# Patient Record
(demographics unavailable — no encounter records)

---

## 2024-12-23 NOTE — HISTORY OF PRESENT ILLNESS
[Disease: _____________________] : Disease: [unfilled] [T: ___] : T[unfilled] [N: ___] : N[unfilled] [AJCC Stage: ____] : AJCC Stage: [unfilled] [de-identified] : Age 41: triple negative right breast cancer  Screen detected: she had breast imaging done on 6/19/2024 which showed 0.8 cm mass in the upper inner right breast 3 cm from the nipple. Call back imaging with sonogram showed benign complicated cyst at 1:00 3 cm FN measuring 1.2 cm at site of nodule seen on mammogram. She had follow up breast imaging done on 10/4/2024 which showed indeterminate 1:00 indeterminate nodule. She had biopsy of the right breast 1:00 3 cm FN nodule on 10/14/2024. The biopsy showed invasive ductal carcinoma poorly differentiated ER negative, [place] negative, Her2 negative. MRI of the breast done on 11/13/2024 showed newly diagnosed malignancy in the right breast 1.4 cm enhancing mass containing a biopsy marker in the upper inner quadrant and no significant axillary or internal mammary adenopathy. She underwent right lumpectomy and SLNB with Dr Torres on 12/6/2024. The pathology showed invasive ductal carcinoma measuring 19 mm, sentinel and non-sentinel lymph nodes were negative (0/12), DCIS was present and DCIS within 1 mm of inferior margin.  [de-identified] :  invasive ductal carcinoma poorly differentiated ER negative, [place] negative, Her2 negative [de-identified] : Genetic testing: BRCA 1 positive: [id number] [de-identified] : She is present with her  to review adjuvant recommendations. She has no FH of breast cancer. 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 She takes cholesterol agent but no other medications. Denies any new breast pain or chest wall changes. No back pain, cough or HA. She is taking step by step and not sure if she will have mastectomy. She has not seen GYN for BRCA 1 status. She is not thinking of having more children and does not want to proceed with fertility preservation.

## 2024-12-23 NOTE — CONSULT LETTER
[Dear  ___] : Dear  [unfilled], [Consult Letter:] : I had the pleasure of evaluating your patient, [unfilled]. [( Thank you for referring [unfilled] for consultation for _____ )] : Thank you for referring [unfilled] for consultation for [unfilled] [Please see my note below.] : Please see my note below. [Consult Closing:] : Thank you very much for allowing me to participate in the care of this patient.  If you have any questions, please do not hesitate to contact me. [Sincerely,] : Sincerely, [FreeTextEntry2] : Ness Torres  [facility] [address]  [place] [address] [FreeTextEntry3] : Saud Lawton MD Attending [facility] [DrEduard  ___] : Dr. WU

## 2024-12-23 NOTE — REVIEW OF SYSTEMS
[Diarrhea: Grade 0] : Diarrhea: Grade 0 [Negative] : Allergic/Immunologic [Skin Rash] : no skin rash [Skin Wound] : no skin wound [de-identified] : right lumpectomy healing with inner upper breast tenderness occasional

## 2024-12-23 NOTE — ASSESSMENT
"CC: Establish care, eczema      Dorothy Cedeño is a 25 y.o. female here to establish care and to discuss the evaluation and management of:    1. Eczema, unspecified type  Patient states that she has had a dry patch on her right knee for several years.  Has not had it really checked out.  States she has been using some psoriasis over-the-counter lotion it sometimes helps.  States that it does itch.  Denies any bleeding or pustules.  States that it can be painful especially if she scratches too hard.    2. Depressed mood  States she fluctuates with her mood.  States that during the fall she seems to be bit more depressed.  She states that it will be \"cyclic \".  Sometimes this will cycle on and off for 1-2 weeks.  Denies any suicidal thoughts or ideations.  She does make note that she is not interested in starting any sort of medications.    3. Other fatigue  4. Dizziness  Patient states that sometimes she feels a bit dizzy and fatigued.  States that she does not feel like she is staying adequately nutrition.  States that sometimes she self sabotage herself by not eating, especially when she is feeling depressed.  Denies any syncopal episodes.    5. Anxiety  States she occasionally has some anxiety.  Does not like to take any medications so she tries to modify with behavior.    6. IUD (intrauterine device) in place  Currently has the ParaGard in since January 2017.    7. Need for vaccination  Requesting flu vaccine.    8. Need for HPV vaccine  Has not had the HPV vaccinations.        ROS:  Denies any Headache, Blurred Vision, Confusion, Chest pain,  Shortness of breath,  Abdominal pain, Changes of bowel or bladder, Hematuria, Hematochezia, Lower ext. edema, Fevers, Nights sweats, Weight Changes, Focal weakness or numbness.  And all other systems are negative.  Red itchy spot on her right knee      Current Outpatient Prescriptions:   •  triamcinolone acetonide (KENALOG) 0.1 % Cream, Apply 1 g to affected area(s) 2 times a " [FreeTextEntry1] : She is a premenopausal BRCA 1 positive  F with Stage I right breast  invasive ductal carcinoma poorly differentiated ER negative, [place] negative, Her2 negative s/p right lumpectomy. She currently is still deciding if she wants mastectomy. We reviewed the significance of triple negative breast cancer which despite Stage 1 still has chance of local and distant recurrence. We reviewed the role of chemotherapy to lessen the risk of breast cancer recurrence by 40%. We reviewed the different chemotherapy options: ddACT, TC versus CMF. We reviewed potential of chemotherapy to cause early menopause and affect fertility: she does not want fertility preservation and does not want to preserve eggs: has 2 children.   Given her age, her good performance status and triple negative breast cancer, we reviewed the ACT regimen: AC (doxorubicin/ cyclophosphamide) given every 2 weeks for 4 cycles and T (paclitaxel) weekly x 12 weeks. We reviewed potential side effects including but not limited to: hot flash, GI upset, nausea, vomiting, hair loss, allergic reaction, low blood counts, increased risk of infection, bone / muscle pain, numbness/ tingling of the fingers and toes, diarrhea, constipation, and fatigue. We reviewed supportive measures to reduce these side effects. We explained that the supportive measures and therapy can be further modified if she has any intolerances. We reviewed the less than 1% risk of cardiotoxicity and leukemia and how we minimize this risk and monitor for this. Reviewed Echo evaluation of heart function. We reviewed power port placement for ease of chemotherapy administration. Written information about the chemotherapy was provided to her. Questions were answered to her satisfaction and she consented to adjuvant therapy: will plan for 2nd or 3rd week of January: 4 to 6 weeks from surgery to allow for healing.   BRCA 1 positive, We reviewed consideration of oophorectomy and evaluation with GYN: will refer after adjuvant chemotherapy. We reviewed baseline CT of the chest/ abd pelvis prior to adjuvant chemotherapy. Will obtain baseline blood work today.  "day., Disp: 30 g, Rfl: 3    No Known Allergies    Past Medical History:   Diagnosis Date   • UTI (urinary tract infection)     had a lot as a child     Past Surgical History:   Procedure Laterality Date   • TONSILLECTOMY AND ADENOIDECTOMY      at age 3     Family History   Problem Relation Age of Onset   • Thyroid Mother    • Depression Mother    • Anxiety disorder Mother      Social History     Social History   • Marital status: Single     Spouse name: N/A   • Number of children: N/A   • Years of education: N/A     Occupational History   • Not on file.     Social History Main Topics   • Smoking status: Never Smoker   • Smokeless tobacco: Never Used   • Alcohol use Yes      Comment: socially   • Drug use: Yes     Types: Marijuana   • Sexual activity: Yes     Partners: Male     Birth control/ protection: IUD     Other Topics Concern   • Not on file     Social History Narrative   • No narrative on file       Objective:     Vitals: /72 (BP Location: Right arm, Patient Position: Sitting)   Pulse 74   Temp 36.8 °C (98.3 °F) (Temporal)   Resp 14   Ht 1.676 m (5' 6\")   Wt 73 kg (161 lb)   LMP 09/19/2018   SpO2 98%   BMI 25.99 kg/m²      General: Alert, pleasant, NAD  HEENT:  Normocephalic.    Heart:  Regular rate and rhythm.  S1 and S2 normal.  No murmurs appreciated.    Respiratory:  Normal respiratory effort.  Clear to auscultation bilaterally.    Skin:  Warm, dry, no rashes.  Right knee on patella and tibial tuberosity with dry, leg pain, thickened skin.  No definite borders, no purulent drainage, no bleeding.  Musculoskeletal:  Gait is normal.  Moves all extremities well.  Extremities:  . No leg edema.  Neurological: No tremors, sensation grossly intact,   Psych:  Affect/mood is normal, judgement is good, memory is intact, grooming is appropriate.      Assessment and Plan.   25 y.o. female to establish care and discuss following    1. Eczema, unspecified type  Have discussed with the patient that this " [Curative] : Goals of care discussed with patient: Curative seems to be presenting like a eczema or chronic dermatitis.  Present on the right knee just along the tibial tuberosity as well as her patella.  Dry hypertrophied skin, slightly pink in color, not flaky, not bleeding, no pustules, no clearly defined borders.  Will have her try Aquaphor ointment after showers every day.  Have advised her also on her worst spots she may try using triamcinolone cream twice a day.  She will follow back up if symptoms worsen.    - triamcinolone acetonide (KENALOG) 0.1 % Cream; Apply 1 g to affected area(s) 2 times a day.  Dispense: 30 g; Refill: 3    2. Depressed mood  Patient states that she has been having this for several years.  States that she will cycle on and off with her depressed mood.  Denies any suicidal thoughts or ideations.  She is not interested in counseling or medications at this time.  Have advised her to try and start journaling, make a list of positive things in her life, ensure adequate hydration, ensure proper activity and exercise.    3. Other fatigue  - TSH WITH REFLEX TO FT4; Future  - COMP METABOLIC PANEL; Future  - CBC WITHOUT DIFFERENTIAL; Future  - FERRITIN; Future  - IRON/TOTAL IRON BIND; Future    4. Dizziness  - COMP METABOLIC PANEL; Future    5. Anxiety  Stable at this time.  Occasional occurrence.  Have reviewed with her some behavior modification she can include such as deep breathing, journaling, stepping away and taking a break from the situation.  She will follow back up if she needs to discuss any further.    6. IUD (intrauterine device) in place  ParaGard in place since June 2017.  Patient states she is tolerating this well.  Follows up with GYN.    7. Need for vaccination  - Influenza Vaccine Quad Injection >3Y (PF)    8. Need for HPV vaccine  This is her first vaccine for HPV.  She has been advised to follow-up for 2 more  vaccines.  - Gardasil 9 (#1)      Health Maintenance:   Influenza:recieved today  Pap: 2017-normal      Return if symptoms  worsen or fail to improve.    I have placed the above orders and discussed them with an approved delegating provider.  The MA is performing the below orders under the direction of Dr. Jason REDDY

## 2024-12-23 NOTE — REVIEW OF SYSTEMS
[Diarrhea: Grade 0] : Diarrhea: Grade 0 [Negative] : Allergic/Immunologic [Skin Rash] : no skin rash [Skin Wound] : no skin wound [de-identified] : right lumpectomy healing with inner upper breast tenderness occasional

## 2024-12-23 NOTE — PHYSICAL EXAM
[Fully active, able to carry on all pre-disease performance without restriction] : Status 0 - Fully active, able to carry on all pre-disease performance without restriction [Normal] : affect appropriate [de-identified] : elgin areolar scar healing; mild edema but no erythema. No palpable axillary LN

## 2024-12-23 NOTE — HISTORY OF PRESENT ILLNESS
[Disease: _____________________] : Disease: [unfilled] [T: ___] : T[unfilled] [N: ___] : N[unfilled] [AJCC Stage: ____] : AJCC Stage: [unfilled] [de-identified] : Age 41: triple negative right breast cancer  Screen detected: she had breast imaging done on 6/19/2024 which showed 0.8 cm mass in the upper inner right breast 3 cm from the nipple. Call back imaging with sonogram showed benign complicated cyst at 1:00 3 cm FN measuring 1.2 cm at site of nodule seen on mammogram. She had follow up breast imaging done on 10/4/2024 which showed indeterminate 1:00 indeterminate nodule. She had biopsy of the right breast 1:00 3 cm FN nodule on 10/14/2024. The biopsy showed invasive ductal carcinoma poorly differentiated ER negative, [place] negative, Her2 negative. MRI of the breast done on 11/13/2024 showed newly diagnosed malignancy in the right breast 1.4 cm enhancing mass containing a biopsy marker in the upper inner quadrant and no significant axillary or internal mammary adenopathy. She underwent right lumpectomy and SLNB with Dr Torres on 12/6/2024. The pathology showed invasive ductal carcinoma measuring 19 mm, sentinel and non-sentinel lymph nodes were negative (0/12), DCIS was present and DCIS within 1 mm of inferior margin.  [de-identified] :  invasive ductal carcinoma poorly differentiated ER negative, [place] negative, Her2 negative [de-identified] : Genetic testing: BRCA 1 positive: [id number] [de-identified] : She is present with her  to review adjuvant recommendations. She has no FH of breast cancer. 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 She takes cholesterol agent but no other medications. Denies any new breast pain or chest wall changes. No back pain, cough or HA. She is taking step by step and not sure if she will have mastectomy. She has not seen GYN for BRCA 1 status. She is not thinking of having more children and does not want to proceed with fertility preservation.

## 2024-12-23 NOTE — PHYSICAL EXAM
[Fully active, able to carry on all pre-disease performance without restriction] : Status 0 - Fully active, able to carry on all pre-disease performance without restriction [Normal] : affect appropriate [de-identified] : elgin areolar scar healing; mild edema but no erythema. No palpable axillary LN

## 2024-12-23 NOTE — CONSULT LETTER
[Dear  ___] : Dear  [unfilled], [Consult Letter:] : I had the pleasure of evaluating your patient, [unfilled]. [( Thank you for referring [unfilled] for consultation for _____ )] : Thank you for referring [unfilled] for consultation for [unfilled] [Please see my note below.] : Please see my note below. [Consult Closing:] : Thank you very much for allowing me to participate in the care of this patient.  If you have any questions, please do not hesitate to contact me. [Sincerely,] : Sincerely, [FreeTextEntry2] : Ness Torres  [facility] [address]  [place] [address] [FreeTextEntry3] : Saud Lawton MD Attending [facility] [DrEduard  ___] : Dr. WU

## 2024-12-23 NOTE — REASON FOR VISIT
[Initial Consultation] : an initial consultation [Spouse] : spouse [FreeTextEntry2] : Evaluation of triple negative breast cancer s/p lumpectomy and SLNB

## 2024-12-23 NOTE — ASSESSMENT
[FreeTextEntry1] : She is a premenopausal BRCA 1 positive  F with Stage I right breast  invasive ductal carcinoma poorly differentiated ER negative, [place] negative, Her2 negative s/p right lumpectomy. She currently is still deciding if she wants mastectomy. We reviewed the significance of triple negative breast cancer which despite Stage 1 still has chance of local and distant recurrence. We reviewed the role of chemotherapy to lessen the risk of breast cancer recurrence by 40%. We reviewed the different chemotherapy options: ddACT, TC versus CMF. We reviewed potential of chemotherapy to cause early menopause and affect fertility: she does not want fertility preservation and does not want to preserve eggs: has 2 children.   Given her age, her good performance status and triple negative breast cancer, we reviewed the ACT regimen: AC (doxorubicin/ cyclophosphamide) given every 2 weeks for 4 cycles and T (paclitaxel) weekly x 12 weeks. We reviewed potential side effects including but not limited to: hot flash, GI upset, nausea, vomiting, hair loss, allergic reaction, low blood counts, increased risk of infection, bone / muscle pain, numbness/ tingling of the fingers and toes, diarrhea, constipation, and fatigue. We reviewed supportive measures to reduce these side effects. We explained that the supportive measures and therapy can be further modified if she has any intolerances. We reviewed the less than 1% risk of cardiotoxicity and leukemia and how we minimize this risk and monitor for this. Reviewed Echo evaluation of heart function. We reviewed power port placement for ease of chemotherapy administration. Written information about the chemotherapy was provided to her. Questions were answered to her satisfaction and she consented to adjuvant therapy: will plan for 2nd or 3rd week of January: 4 to 6 weeks from surgery to allow for healing.   BRCA 1 positive, We reviewed consideration of oophorectomy and evaluation with GYN: will refer after adjuvant chemotherapy. We reviewed baseline CT of the chest/ abd pelvis prior to adjuvant chemotherapy. Will obtain baseline blood work today.  [Curative] : Goals of care discussed with patient: Curative

## 2025-01-13 NOTE — HISTORY OF PRESENT ILLNESS
[FreeTextEntry1] : accompanied by  Paul [phone] feels ok HCG negative 1/13/2025 only takes cholesterol medication [FreeTextEntry5] : yesterday at 900pm [FreeTextEntry6] : Dr. Muniz

## 2025-01-13 NOTE — HISTORY OF PRESENT ILLNESS
[FreeTextEntry5] : yesterday at 900pm [FreeTextEntry1] : accompanied by  Paul [phone] feels ok HCG negative 1/13/2025 only takes cholesterol medication [FreeTextEntry6] : Dr. Muniz

## 2025-01-13 NOTE — PROCEDURE
[D/C IV on discharge] : D/C IV on discharge [Resume diet] : resume diet [Site check for bleeding/hematoma] : Site check for bleeding/hematoma [Vital signs on admission the q 15 mins x2] : Vital signs on admission the q 15 mins x2 [FreeTextEntry1] : mediport insertion left chest

## 2025-01-13 NOTE — ASSESSMENT
[FreeTextEntry1] : Pt seen and assessed for placement of port for venous access.  Chart reviewed, imaging and labs evaluated and acceptable for intervention. Consent obtained with risks, benefits explained.  Will place port with sedation. Anesthesia plan formulated and discussed with anesthesiology.   8 [place] powerport placed using US and fluoro guidance. Tip in SVC. EBL=minimal.  May use immediately.  Full report to follow.

## 2025-01-13 NOTE — PAST MEDICAL HISTORY
[Increasing age ( >40 years old)] : Increasing age ( >40 years old) [Malignancy] : Malignancy [No therapy indicated for cases scheduled for less than one hour] : No therapy indicated for cases scheduled for less than one hour. [Obesity: BMI >25] : Obesity: BMI >25 [FreeTextEntry1] : Malignant Hyperthermia Screening Tool and Risk of Bleeding Assessment  Ms. CINDA LUIS denies family history of unexpected death following Anesthesia or Exercise. Denies Family history of Malignant Hyperthermia, Muscle or Neuromuscular disorder and High Temperature following exercise.  Ms. CINDA LUIS denies history of Muscle Spasm, Dark or Chocolate - Colored urine and Unanticipated fever immediately following anesthesia or serious exercise.  Ms. LUIS also denies bleeding tendencies/ Risks of Bleeding.

## 2025-01-13 NOTE — ASSESSMENT
[FreeTextEntry1] : Pt seen and assessed for placement of port for venous access.  Chart reviewed, imaging and labs evaluated and acceptable for intervention. Consent obtained with risks, benefits explained.  Will place port with sedation. Anesthesia plan formulated and discussed with anesthesiology.   8 [place]uvian powerport placed using US and fluoro guidance. Tip in SVC. EBL=minimal.  May use immediately.  Full report to follow.

## 2025-01-27 NOTE — HISTORY OF PRESENT ILLNESS
[Disease: _____________________] : Disease: [unfilled] [T: ___] : T[unfilled] [N: ___] : N[unfilled] [AJCC Stage: ____] : AJCC Stage: [unfilled] [de-identified] : Age 41: triple negative right breast cancer  Screen detected: she had breast imaging done on 6/19/2024 which showed 0.8 cm mass in the upper inner right breast 3 cm from the nipple. Call back imaging with sonogram showed benign complicated cyst at 1:00 3 cm FN measuring 1.2 cm at site of nodule seen on mammogram. She had follow up breast imaging done on 10/4/2024 which showed indeterminate 1:00 indeterminate nodule. She had biopsy of the right breast 1:00 3 cm FN nodule on 10/14/2024. The biopsy showed invasive ductal carcinoma poorly differentiated ER negative, [place] negative, Her2 negative. MRI of the breast done on 11/13/2024 showed newly diagnosed malignancy in the right breast 1.4 cm enhancing mass containing a biopsy marker in the upper inner quadrant and no significant axillary or internal mammary adenopathy. She underwent right lumpectomy and SLNB with Dr Torres on 12/6/2024. The pathology showed invasive ductal carcinoma measuring 19 mm, sentinel and non-sentinel lymph nodes were negative (0/12), DCIS was present and DCIS within 1 mm of inferior margin.  [de-identified] :  invasive ductal carcinoma poorly differentiated ER negative, [place] negative, Her2 negative [de-identified] : Genetic testing: BRCA 1 positive: [id number] [de-identified] : She is present with her  to start adjuvant chemo. Healed well from lumpectomy.. Has some anxiety regarding starting chemo today but is agreeable to start therapy. Denies any new breast pain or chest wall changes. No back pain, cough or HA. She is taking step by step and not sure if she will have mastectomy.    [Date: ____________] : Patient's last distress assessment performed on [unfilled]. [7 - Distress Level] : Distress Level: 7 [Referred Patient  to social work for follow-up] : Patient was referred to social work for follow-up

## 2025-01-27 NOTE — REVIEW OF SYSTEMS
[Diarrhea: Grade 0] : Diarrhea: Grade 0 [Negative] : Allergic/Immunologic [Skin Rash] : no skin rash [Skin Wound] : no skin wound [de-identified] : right lumpectomy healing with inner upper breast tenderness occasional

## 2025-01-27 NOTE — CONSULT LETTER
[Dear  ___] : Dear  [unfilled], [Consult Letter:] : I had the pleasure of evaluating your patient, [unfilled]. [( Thank you for referring [unfilled] for consultation for _____ )] : Thank you for referring [unfilled] for consultation for [unfilled] [Please see my note below.] : Please see my note below. [Consult Closing:] : Thank you very much for allowing me to participate in the care of this patient.  If you have any questions, please do not hesitate to contact me. [Sincerely,] : Sincerely, [DrEduard  ___] : Dr. WU [FreeTextEntry2] : Ness Torres  [facility] [address]  [place] [address] [FreeTextEntry3] : Saud Lawton MD Attending [facility]

## 2025-01-27 NOTE — PHYSICAL EXAM
[Fully active, able to carry on all pre-disease performance without restriction] : Status 0 - Fully active, able to carry on all pre-disease performance without restriction [Normal] : affect appropriate [de-identified] : elgin areolar scar healing; mild edema but no erythema. No palpable axillary LN

## 2025-01-27 NOTE — PHYSICAL EXAM
[Fully active, able to carry on all pre-disease performance without restriction] : Status 0 - Fully active, able to carry on all pre-disease performance without restriction [Normal] : affect appropriate [de-identified] : elgin areolar scar healing; mild edema but no erythema. No palpable axillary LN

## 2025-01-27 NOTE — HISTORY OF PRESENT ILLNESS
[Disease: _____________________] : Disease: [unfilled] [T: ___] : T[unfilled] [N: ___] : N[unfilled] [AJCC Stage: ____] : AJCC Stage: [unfilled] [de-identified] : Age 41: triple negative right breast cancer  Screen detected: she had breast imaging done on 6/19/2024 which showed 0.8 cm mass in the upper inner right breast 3 cm from the nipple. Call back imaging with sonogram showed benign complicated cyst at 1:00 3 cm FN measuring 1.2 cm at site of nodule seen on mammogram. She had follow up breast imaging done on 10/4/2024 which showed indeterminate 1:00 indeterminate nodule. She had biopsy of the right breast 1:00 3 cm FN nodule on 10/14/2024. The biopsy showed invasive ductal carcinoma poorly differentiated ER negative, [place] negative, Her2 negative. MRI of the breast done on 11/13/2024 showed newly diagnosed malignancy in the right breast 1.4 cm enhancing mass containing a biopsy marker in the upper inner quadrant and no significant axillary or internal mammary adenopathy. She underwent right lumpectomy and SLNB with Dr Torres on 12/6/2024. The pathology showed invasive ductal carcinoma measuring 19 mm, sentinel and non-sentinel lymph nodes were negative (0/12), DCIS was present and DCIS within 1 mm of inferior margin.  [de-identified] :  invasive ductal carcinoma poorly differentiated ER negative, [place] negative, Her2 negative [de-identified] : Genetic testing: BRCA 1 positive: [id number] [de-identified] : She is present with her  to start adjuvant chemo. Healed well from lumpectomy.. Has some anxiety regarding starting chemo today but is agreeable to start therapy. Denies any new breast pain or chest wall changes. No back pain, cough or HA. She is taking step by step and not sure if she will have mastectomy.    [Date: ____________] : Patient's last distress assessment performed on [unfilled]. [7 - Distress Level] : Distress Level: 7 [Referred Patient  to social work for follow-up] : Patient was referred to social work for follow-up

## 2025-01-27 NOTE — PHYSICAL EXAM
[Fully active, able to carry on all pre-disease performance without restriction] : Status 0 - Fully active, able to carry on all pre-disease performance without restriction [Normal] : affect appropriate [de-identified] : elgin areolar scar healing; mild edema but no erythema. No palpable axillary LN

## 2025-01-27 NOTE — REVIEW OF SYSTEMS
[Diarrhea: Grade 0] : Diarrhea: Grade 0 [Negative] : Allergic/Immunologic [Skin Rash] : no skin rash [Skin Wound] : no skin wound [de-identified] : right lumpectomy healing with inner upper breast tenderness occasional

## 2025-01-27 NOTE — ASSESSMENT
[Curative] : Goals of care discussed with patient: Curative [FreeTextEntry1] : 40 yo premenopausal BRCA 1 positive  F with Stage I right breast  invasive ductal carcinoma poorly differentiated ER negative, LA negative, Her2 negative s/p right lumpectomy. She currently is still deciding if she wants mastectomy.   Reviewed at previous visit: -significance of triple negative breast cancer which despite Stage 1 still has chance of local and distant recurrence. -role of chemotherapy to lessen the risk of breast cancer recurrence by 40%.  -different chemotherapy options: ddACT, TC versus CMF.  --side effects including but not limited to: hot flash, GI upset, nausea, vomiting, hair loss, allergic reaction, low blood counts, increased risk of infection, bone / muscle pain, numbness/ tingling of the fingers and toes, diarrhea, constipation, and fatigue.  -supportive measures to reduce these side effects: supportive measures and therapy can be further modified if she has any intolerances.  -potential for less than 1% risk of cardiotoxicity and leukemia and how we minimize this risk and monitor for this. -potential of chemotherapy to cause early menopause and affect fertility (she does not want fertility preservation and does not want to preserve eggs: has 2 children).   Plan: -Given her age, her good performance status and triple negative breast cancer, plan for AC (doxorubicin/ cyclophosphamide) given every 2 weeks for 4 cycles and T (paclitaxel) weekly x 12 weeks   -s/p Mediport placement -ECHO wnl -CT C/A/P no distant metastases -Labs wnl -patient to start adjuvant ddAC today. (Written information about the chemotherapy was provided to her previously). -chemo consent in chart -reviewed and provided schedule for supportive medications  -BRCA 1 positive: consideration of oophorectomy and evaluation with GYN-plan to refer after adjuvant chemotherapy.    Questions were answered to her satisfaction and she consented to adjuvant therapy:

## 2025-01-27 NOTE — ASSESSMENT
[Curative] : Goals of care discussed with patient: Curative [FreeTextEntry1] : 42 yo premenopausal BRCA 1 positive  F with Stage I right breast  invasive ductal carcinoma poorly differentiated ER negative, NV negative, Her2 negative s/p right lumpectomy. She currently is still deciding if she wants mastectomy.   Reviewed at previous visit: -significance of triple negative breast cancer which despite Stage 1 still has chance of local and distant recurrence. -role of chemotherapy to lessen the risk of breast cancer recurrence by 40%.  -different chemotherapy options: ddACT, TC versus CMF.  --side effects including but not limited to: hot flash, GI upset, nausea, vomiting, hair loss, allergic reaction, low blood counts, increased risk of infection, bone / muscle pain, numbness/ tingling of the fingers and toes, diarrhea, constipation, and fatigue.  -supportive measures to reduce these side effects: supportive measures and therapy can be further modified if she has any intolerances.  -potential for less than 1% risk of cardiotoxicity and leukemia and how we minimize this risk and monitor for this. -potential of chemotherapy to cause early menopause and affect fertility (she does not want fertility preservation and does not want to preserve eggs: has 2 children).   Plan: -Given her age, her good performance status and triple negative breast cancer, plan for AC (doxorubicin/ cyclophosphamide) given every 2 weeks for 4 cycles and T (paclitaxel) weekly x 12 weeks   -s/p Mediport placement -ECHO wnl -CT C/A/P no distant metastases -Labs wnl -patient to start adjuvant ddAC today. (Written information about the chemotherapy was provided to her previously). -chemo consent in chart -reviewed and provided schedule for supportive medications  -BRCA 1 positive: consideration of oophorectomy and evaluation with GYN-plan to refer after adjuvant chemotherapy.    Questions were answered to her satisfaction and she consented to adjuvant therapy:

## 2025-01-27 NOTE — ASSESSMENT
[Curative] : Goals of care discussed with patient: Curative [FreeTextEntry1] : 42 yo premenopausal BRCA 1 positive  F with Stage I right breast  invasive ductal carcinoma poorly differentiated ER negative, [place] negative, Her2 negative s/p right lumpectomy. She currently is still deciding if she wants mastectomy.   Reviewed at previous visit: -significance of triple negative breast cancer which despite Stage 1 still has chance of local and distant recurrence. -role of chemotherapy to lessen the risk of breast cancer recurrence by 40%.  -different chemotherapy options: ddACT, TC versus CMF.  --side effects including but not limited to: hot flash, GI upset, nausea, vomiting, hair loss, allergic reaction, low blood counts, increased risk of infection, bone / muscle pain, numbness/ tingling of the fingers and toes, diarrhea, constipation, and fatigue.  -supportive measures to reduce these side effects: supportive measures and therapy can be further modified if she has any intolerances.  -potential for less than 1% risk of cardiotoxicity and leukemia and how we minimize this risk and monitor for this. -potential of chemotherapy to cause early menopause and affect fertility (she does not want fertility preservation and does not want to preserve eggs: has 2 children).   Plan: -Given her age, her good performance status and triple negative breast cancer, plan for AC (doxorubicin/ cyclophosphamide) given every 2 weeks for 4 cycles and T (paclitaxel) weekly x 12 weeks   -s/p Mediport placement -ECHO wnl -CT C/A/P no distant metastases -Labs wnl -patient to start adjuvant ddAC today. (Written information about the chemotherapy was provided to her previously). -chemo consent in chart -reviewed and provided schedule for supportive medications  -BRCA 1 positive: consideration of oophorectomy and evaluation with GYN-plan to refer after adjuvant chemotherapy.    Questions were answered to her satisfaction and she consented to adjuvant therapy:    no

## 2025-01-27 NOTE — REVIEW OF SYSTEMS
[Diarrhea: Grade 0] : Diarrhea: Grade 0 [Negative] : Allergic/Immunologic [Skin Rash] : no skin rash [Skin Wound] : no skin wound [de-identified] : right lumpectomy healing with inner upper breast tenderness occasional

## 2025-01-27 NOTE — CONSULT LETTER
[Dear  ___] : Dear  [unfilled], [Consult Letter:] : I had the pleasure of evaluating your patient, [unfilled]. [( Thank you for referring [unfilled] for consultation for _____ )] : Thank you for referring [unfilled] for consultation for [unfilled] [Please see my note below.] : Please see my note below. [Consult Closing:] : Thank you very much for allowing me to participate in the care of this patient.  If you have any questions, please do not hesitate to contact me. [Sincerely,] : Sincerely, [DrEduard  ___] : Dr. WU [FreeTextEntry2] : Ness Torres  [facility] [address]  [place] [address] [FreeTextEntry3] : Saud Lawton MD Attending [facility]

## 2025-01-27 NOTE — HISTORY OF PRESENT ILLNESS
[Disease: _____________________] : Disease: [unfilled] [T: ___] : T[unfilled] [N: ___] : N[unfilled] [AJCC Stage: ____] : AJCC Stage: [unfilled] [de-identified] : Age 41: triple negative right breast cancer  Screen detected: she had breast imaging done on 6/19/2024 which showed 0.8 cm mass in the upper inner right breast 3 cm from the nipple. Call back imaging with sonogram showed benign complicated cyst at 1:00 3 cm FN measuring 1.2 cm at site of nodule seen on mammogram. She had follow up breast imaging done on 10/4/2024 which showed indeterminate 1:00 indeterminate nodule. She had biopsy of the right breast 1:00 3 cm FN nodule on 10/14/2024. The biopsy showed invasive ductal carcinoma poorly differentiated ER negative, [place] negative, Her2 negative. MRI of the breast done on 11/13/2024 showed newly diagnosed malignancy in the right breast 1.4 cm enhancing mass containing a biopsy marker in the upper inner quadrant and no significant axillary or internal mammary adenopathy. She underwent right lumpectomy and SLNB with Dr Torres on 12/6/2024. The pathology showed invasive ductal carcinoma measuring 19 mm, sentinel and non-sentinel lymph nodes were negative (0/12), DCIS was present and DCIS within 1 mm of inferior margin.  [de-identified] :  invasive ductal carcinoma poorly differentiated ER negative, [place] negative, Her2 negative [de-identified] : Genetic testing: BRCA 1 positive: [id number] [de-identified] : She is present with her  to start adjuvant chemo. Healed well from lumpectomy.. Has some anxiety regarding starting chemo today but is agreeable to start therapy. Denies any new breast pain or chest wall changes. No back pain, cough or HA. She is taking step by step and not sure if she will have mastectomy.    [Date: ____________] : Patient's last distress assessment performed on [unfilled]. [7 - Distress Level] : Distress Level: 7 [Referred Patient  to social work for follow-up] : Patient was referred to social work for follow-up

## 2025-01-27 NOTE — END OF VISIT
[Time Spent: ___ minutes] : I have spent [unfilled] minutes of time on the encounter which excludes teaching and separately reported services. [] : Fellow [FreeTextEntry3] : She is present to start C1 of AC. She had CT of the chest/ abd/ pelvis without any evidence of disease. She had Echo with EF WNL. We reviewed potential AE from AC and supportive measures. We reviewed timing of AE and expectations for recovery. Questions answered to her satisfaction. She will follow instructions and we will follow up with her. She will  antiemetics from pharmacy. Port site C/D/I; lungs clear, Extremities: no edema. CBC WNL. Will proceed to treatment today.

## 2025-01-27 NOTE — REASON FOR VISIT
[Follow-Up Visit] : a follow-up [Initial Consultation] : an initial consultation [Spouse] : spouse [FreeTextEntry2] : Evaluation of triple negative breast cancer s/p lumpectomy and SLNB

## 2025-01-27 NOTE — CONSULT LETTER
[Dear  ___] : Dear  [unfilled], [Consult Letter:] : I had the pleasure of evaluating your patient, [unfilled]. [( Thank you for referring [unfilled] for consultation for _____ )] : Thank you for referring [unfilled] for consultation for [unfilled] [Please see my note below.] : Please see my note below. [Consult Closing:] : Thank you very much for allowing me to participate in the care of this patient.  If you have any questions, please do not hesitate to contact me. [Sincerely,] : Sincerely, [DrEduard  ___] : Dr. WU [FreeTextEntry2] : Ness Torres  [facility] [address]  [place] [address] [FreeTextEntry3] : Saud Lawton MD Attending [facility]

## 2025-02-07 NOTE — PHYSICAL EXAM
[Fully active, able to carry on all pre-disease performance without restriction] : Status 0 - Fully active, able to carry on all pre-disease performance without restriction [Normal] : affect appropriate [de-identified] : elgin areolar scar healing; mild edema but no erythema. No palpable axillary LN

## 2025-02-07 NOTE — ASSESSMENT
[Curative] : Goals of care discussed with patient: Curative [FreeTextEntry1] : She is a 41 y.o. premenopausal BRCA 1 positive  F with Stage I right breast invasive ductal carcinoma poorly differentiated ER negative, [place] negative, Her2 negative s/p right lumpectomy. She had CT scan which showed no evidence of distant metastasis. She began AC (doxorubicin/ cyclophosphamide) every 2 weeks x 4 cycles and T (paclitaxel) weekly x 12 weeks 01/24/2025. She is s/p C1 and noticed Grade 1 fatigue, constipation, and nausea. We reviewed supportive measures to help reduce these symptoms and reviewed therapy can be modified if she has any intolerances. Counts reviewed and stable to proceed with C2 ddAC today. Questions answered to her satisfaction. She is agreeable with plan. Next follow up in 2 weeks but earlier if any new symptoms.   BRCA 1 positive: consideration of oophorectomy and evaluation with GYN-plan to refer after adjuvant chemotherapy.

## 2025-02-07 NOTE — REVIEW OF SYSTEMS
[Diarrhea: Grade 0] : Diarrhea: Grade 0 [Negative] : Allergic/Immunologic [Fatigue] : fatigue [Constipation] : constipation [Dizziness] : dizziness [Fever] : no fever [Chills] : no chills [Abdominal Pain] : no abdominal pain [Vomiting] : no vomiting [Skin Rash] : no skin rash [Skin Wound] : no skin wound [Confused] : no confusion [Fainting] : no fainting [Difficulty Walking] : no difficulty walking [FreeTextEntry7] : occasional nausea  [de-identified] : right lumpectomy healed with inner upper breast tenderness and mild edema

## 2025-02-07 NOTE — REASON FOR VISIT
[Follow-Up Visit] : a follow-up [Family Member] : family member [FreeTextEntry2] : Follow up triple negative breast cancer on ddAC: C2 today

## 2025-02-07 NOTE — HISTORY OF PRESENT ILLNESS
[Disease: _____________________] : Disease: [unfilled] [T: ___] : T[unfilled] [N: ___] : N[unfilled] [AJCC Stage: ____] : AJCC Stage: [unfilled] [Date: ____________] : Patient's last distress assessment performed on [unfilled]. [7 - Distress Level] : Distress Level: 7 [Referred Patient  to social work for follow-up] : Patient was referred to social work for follow-up [de-identified] : Age 41: triple negative right breast cancer  Screen detected: she had breast imaging done on 6/19/2024 which showed 0.8 cm mass in the upper inner right breast 3 cm from the nipple. Call back imaging with sonogram showed benign complicated cyst at 1:00 3 cm FN measuring 1.2 cm at site of nodule seen on mammogram. She had follow up breast imaging done on 10/4/2024 which showed indeterminate 1:00 indeterminate nodule. She had biopsy of the right breast 1:00 3 cm FN nodule on 10/14/2024. The biopsy showed invasive ductal carcinoma poorly differentiated ER negative, [place] negative, Her2 negative. MRI of the breast done on 11/13/2024 showed newly diagnosed malignancy in the right breast 1.4 cm enhancing mass containing a biopsy marker in the upper inner quadrant and no significant axillary or internal mammary adenopathy. She underwent right lumpectomy and SLNB with Dr Torres on 12/6/2024. The pathology showed invasive ductal carcinoma measuring 19 mm, sentinel and non-sentinel lymph nodes were negative (0/12), DCIS was present and DCIS within 1 mm of inferior margin.  [de-identified] :  invasive ductal carcinoma poorly differentiated ER negative, [place] negative, Her2 negative [de-identified] : Genetic testing: BRCA 1 positive: [id number] [de-identified] : She is s/p C1 ddAC and noticed fatigue and dizziness that lasted for a few days post treatment then self-resolved. She denies chest pain or SOB. She had occasional nausea and took prochlorperazine with relief of symptoms, no vomiting. She has a good appetite and had constipation that was relieved with Miralax. She denies any arthralgias/myalgias or bone pain.

## 2025-02-21 NOTE — REASON FOR VISIT
[Follow-Up Visit] : a follow-up [Family Member] : family member [FreeTextEntry2] : Follow up triple negative breast cancer on ddAC: C3 today

## 2025-02-21 NOTE — HISTORY OF PRESENT ILLNESS
[Disease: _____________________] : Disease: [unfilled] [T: ___] : T[unfilled] [N: ___] : N[unfilled] [AJCC Stage: ____] : AJCC Stage: [unfilled] [de-identified] : Age 41: triple negative right breast cancer  Screen detected: she had breast imaging done on 6/19/2024 which showed 0.8 cm mass in the upper inner right breast 3 cm from the nipple. Call back imaging with sonogram showed benign complicated cyst at 1:00 3 cm FN measuring 1.2 cm at site of nodule seen on mammogram. She had follow up breast imaging done on 10/4/2024 which showed indeterminate 1:00 indeterminate nodule. She had biopsy of the right breast 1:00 3 cm FN nodule on 10/14/2024. The biopsy showed invasive ductal carcinoma poorly differentiated ER negative, [place] negative, Her2 negative. MRI of the breast done on 11/13/2024 showed newly diagnosed malignancy in the right breast 1.4 cm enhancing mass containing a biopsy marker in the upper inner quadrant and no significant axillary or internal mammary adenopathy. She underwent right lumpectomy and SLNB with Dr Torres on 12/6/2024. The pathology showed invasive ductal carcinoma measuring 19 mm, sentinel and non-sentinel lymph nodes were negative (0/12), DCIS was present and DCIS within 1 mm of inferior margin.  [de-identified] :  invasive ductal carcinoma poorly differentiated ER negative, [place] negative, Her2 negative [de-identified] : Genetic testing: BRCA 1 positive: [id number] [de-identified] : She is s/p C2 ddAC and noticed fatigue and occasional dizziness that lasted for a few days post treatment then self-resolved. No chest pain or SOB. She denies nausea, vomiting, or diarrhea. She continues to have a good appetite. Has constipation that is improved with Miralax. She denies any arthralgias/myalgias or bone pain. No pain or skin changes over the breasts.

## 2025-02-21 NOTE — REVIEW OF SYSTEMS
[Fatigue] : fatigue [Diarrhea: Grade 0] : Diarrhea: Grade 0 [Dizziness] : dizziness [Negative] : Allergic/Immunologic [Constipation] : constipation [Fever] : no fever [Chills] : no chills [Abdominal Pain] : no abdominal pain [Vomiting] : no vomiting [Skin Rash] : no skin rash [Skin Wound] : no skin wound [Confused] : no confusion [Fainting] : no fainting [Difficulty Walking] : no difficulty walking [de-identified] : right lumpectomy healed with mild edema

## 2025-02-21 NOTE — ASSESSMENT
[Curative] : Goals of care discussed with patient: Curative [FreeTextEntry1] : She is a 41 y.o. premenopausal BRCA 1 positive  F with Stage I right breast invasive ductal carcinoma poorly differentiated ER negative, [place] negative, Her2 negative s/p right lumpectomy. She had CT scan which showed no evidence of distant metastasis. She began AC (doxorubicin/ cyclophosphamide) every 2 weeks x 4 cycles and T (paclitaxel) weekly x 12 weeks 01/24/2025. She is s/p C2 and noticed Grade 1 fatigue and constipation. We reviewed supportive measures to help reduce these symptoms and reviewed therapy can be modified if she has any intolerances. Counts reviewed and stable to proceed with C3 ddAC today. Questions answered to her satisfaction. She is agreeable with plan. Next follow up in 2 weeks but earlier if any new symptoms.   BRCA 1 positive: consideration of oophorectomy and evaluation with GYN-plan to refer after adjuvant chemotherapy.

## 2025-02-21 NOTE — PHYSICAL EXAM
[Fully active, able to carry on all pre-disease performance without restriction] : Status 0 - Fully active, able to carry on all pre-disease performance without restriction [Normal] : affect appropriate [de-identified] : elgin areolar scar healing; mild edema but no erythema. No palpable axillary LN

## 2025-03-08 NOTE — ASSESSMENT
[FreeTextEntry1] : She is a 42 y/o premenopausal BRCA 1 positive  F with Stage I right breast  invasive ductal carcinoma poorly differentiated ER negative, [place] negative, Her2 negative s/p right lumpectomy.  She is on adjuvant ddAC: currently C4 today with cumulative fatigue and mouth sores. We reviewed expectations with current C4 and expectations with energy with paclitaxel. We reviewed supportive measures for mouth sores: will Rx lidocaine viscous to her pharmacy and swish and spit as needed. We reviewed salt water gargles to keep mouth clean after eating. Counts remain WNL on therapy with help of the Neulasta. We reviewed next treatment: paclitaxel to start on 3/28/2025. We reviewed weekly treatment and expectations: potential allergic reaction during infusion, myalgias, arthralgias, GI upset, fatigue and low counts. We reviewed that given this medication is less emetogenic, we would be lessening medication and will no longer need D2 and D3 dexamethasone. We reviewed no additional Onpro needed with weekly treatment. Questions answered to her satisfaction. She is agreeable with plan. Will make additional appointments for treatment and follow up. Next follow up in 2 weeks. Estimated completion of adjuvant therapy: June 2025.  [Curative] : Goals of care discussed with patient: Curative

## 2025-03-08 NOTE — CONSULT LETTER
[Dear  ___] : Dear  [unfilled], [Courtesy Letter:] : I had the pleasure of seeing your patient, [unfilled], in my office today. [Please see my note below.] : Please see my note below. [Consult Closing:] : Thank you very much for allowing me to participate in the care of this patient.  If you have any questions, please do not hesitate to contact me. [Sincerely,] : Sincerely, [FreeTextEntry2] : Ness Torres  [facility] [address]  [place] [address] [FreeTextEntry3] : Saud Lawton MD Attending [facility]

## 2025-03-08 NOTE — HISTORY OF PRESENT ILLNESS
[Disease: _____________________] : Disease: [unfilled] [T: ___] : T[unfilled] [N: ___] : N[unfilled] [AJCC Stage: ____] : AJCC Stage: [unfilled] [de-identified] : Age 41: triple negative right breast cancer  Screen detected: she had breast imaging done on 6/19/2024 which showed 0.8 cm mass in the upper inner right breast 3 cm from the nipple. Call back imaging with sonogram showed benign complicated cyst at 1:00 3 cm FN measuring 1.2 cm at site of nodule seen on mammogram. She had follow up breast imaging done on 10/4/2024 which showed indeterminate 1:00 indeterminate nodule. She had biopsy of the right breast 1:00 3 cm FN nodule on 10/14/2024. The biopsy showed invasive ductal carcinoma poorly differentiated ER negative, [place] negative, Her2 negative. MRI of the breast done on 11/13/2024 showed newly diagnosed malignancy in the right breast 1.4 cm enhancing mass containing a biopsy marker in the upper inner quadrant and no significant axillary or internal mammary adenopathy. She underwent right lumpectomy and SLNB with Dr Torres on 12/6/2024. The pathology showed invasive ductal carcinoma measuring 19 mm, sentinel and non-sentinel lymph nodes were negative (0/12), DCIS was present and DCIS within 1 mm of inferior margin. We reviewed adjuvant therapy and she consented to ddACT. She started on ddAC and had expected fatigue and mouth sores.  [de-identified] :  invasive ductal carcinoma poorly differentiated ER negative, [place] negative, Her2 negative [de-identified] : Genetic testing: BRCA 1 positive: [id number] ddAC 1/24/2025 to 3/7/2025  weekly paclitaxel: 3/28/2025  [de-identified] : She has been having fatigue: more with C3. Able to do her ADLs. Wants to try to go back to work and wondering if this is possible on the paclitaxel. She has occasional mouth sores on the 2nd week that causes discomfort with eating. Denies any worsening bone pain with Neulasta or N/V. She denies any BLE swelling or HA or cough or new abdominal pain.

## 2025-03-08 NOTE — CONSULT LETTER
[Dear  ___] : Dear  [unfilled], [Courtesy Letter:] : I had the pleasure of seeing your patient, [unfilled], in my office today. [Please see my note below.] : Please see my note below. [Consult Closing:] : Thank you very much for allowing me to participate in the care of this patient.  If you have any questions, please do not hesitate to contact me. [Sincerely,] : Sincerely, [FreeTextEntry2] : Ness Torres  [facility] [address]  [place] [address] [FreeTextEntry3] : Saud Lawton MD Attending [facility]

## 2025-03-08 NOTE — PHYSICAL EXAM
[Restricted in physically strenuous activity but ambulatory and able to carry out work of a light or sedentary nature] : Status 1- Restricted in physically strenuous activity but ambulatory and able to carry out work of a light or sedentary nature, e.g., light house work, office work [Normal] : affect appropriate [de-identified] : el[place] areolar scar healing; mild edema but no erythema. No palpable axillary LN

## 2025-03-08 NOTE — REVIEW OF SYSTEMS
[Fatigue] : fatigue [Mucosal Pain] : mucosal pain [Diarrhea: Grade 0] : Diarrhea: Grade 0 [Negative] : Allergic/Immunologic [Fever] : no fever [Chills] : no chills [Night Sweats] : no night sweats [Recent Change In Weight] : ~T no recent weight change [Dysphagia] : no dysphagia [Loss of Hearing] : no loss of hearing [Nosebleeds] : no nosebleeds [Hoarseness] : no hoarseness [Odynophagia] : no odynophagia [FreeTextEntry4] : mouth sores

## 2025-03-08 NOTE — HISTORY OF PRESENT ILLNESS
[Disease: _____________________] : Disease: [unfilled] [T: ___] : T[unfilled] [N: ___] : N[unfilled] [AJCC Stage: ____] : AJCC Stage: [unfilled] [de-identified] : Age 41: triple negative right breast cancer  Screen detected: she had breast imaging done on 6/19/2024 which showed 0.8 cm mass in the upper inner right breast 3 cm from the nipple. Call back imaging with sonogram showed benign complicated cyst at 1:00 3 cm FN measuring 1.2 cm at site of nodule seen on mammogram. She had follow up breast imaging done on 10/4/2024 which showed indeterminate 1:00 indeterminate nodule. She had biopsy of the right breast 1:00 3 cm FN nodule on 10/14/2024. The biopsy showed invasive ductal carcinoma poorly differentiated ER negative, AR negative, Her2 negative. MRI of the breast done on 11/13/2024 showed newly diagnosed malignancy in the right breast 1.4 cm enhancing mass containing a biopsy marker in the upper inner quadrant and no significant axillary or internal mammary adenopathy. She underwent right lumpectomy and SLNB with Dr Torres on 12/6/2024. The pathology showed invasive ductal carcinoma measuring 19 mm, sentinel and non-sentinel lymph nodes were negative (0/12), DCIS was present and DCIS within 1 mm of inferior margin. We reviewed adjuvant therapy and she consented to ddACT. She started on ddAC and had expected fatigue and mouth sores.  [de-identified] :  invasive ductal carcinoma poorly differentiated ER negative, [place] negative, Her2 negative [de-identified] : Genetic testing: BRCA 1 positive: [id number] ddAC 1/24/2025 to 3/7/2025  weekly paclitaxel: 3/28/2025  [de-identified] : She has been having fatigue: more with C3. Able to do her ADLs. Wants to try to go back to work and wondering if this is possible on the paclitaxel. She has occasional mouth sores on the 2nd week that causes discomfort with eating. Denies any worsening bone pain with Neulasta or N/V. She denies any BLE swelling or HA or cough or new abdominal pain.

## 2025-03-08 NOTE — ASSESSMENT
[FreeTextEntry1] : She is a 42 y/o premenopausal BRCA 1 positive  F with Stage I right breast  invasive ductal carcinoma poorly differentiated ER negative, ND negative, Her2 negative s/p right lumpectomy.  She is on adjuvant ddAC: currently C4 today with cumulative fatigue and mouth sores. We reviewed expectations with current C4 and expectations with energy with paclitaxel. We reviewed supportive measures for mouth sores: will Rx lidocaine viscous to her pharmacy and swish and spit as needed. We reviewed salt water gargles to keep mouth clean after eating. Counts remain WNL on therapy with help of the Neulasta. We reviewed next treatment: paclitaxel to start on 3/28/2025. We reviewed weekly treatment and expectations: potential allergic reaction during infusion, myalgias, arthralgias, GI upset, fatigue and low counts. We reviewed that given this medication is less emetogenic, we would be lessening medication and will no longer need D2 and D3 dexamethasone. We reviewed no additional Onpro needed with weekly treatment. Questions answered to her satisfaction. She is agreeable with plan. Will make additional appointments for treatment and follow up. Next follow up in 2 weeks. Estimated completion of adjuvant therapy: June 2025.  [Curative] : Goals of care discussed with patient: Curative

## 2025-03-08 NOTE — PHYSICAL EXAM
[Restricted in physically strenuous activity but ambulatory and able to carry out work of a light or sedentary nature] : Status 1- Restricted in physically strenuous activity but ambulatory and able to carry out work of a light or sedentary nature, e.g., light house work, office work [Normal] : affect appropriate [de-identified] : el[place] areolar scar healing; mild edema but no erythema. No palpable axillary LN

## 2025-03-28 NOTE — REVIEW OF SYSTEMS
[Fatigue] : fatigue [Diarrhea: Grade 0] : Diarrhea: Grade 0 [Negative] : ENT [Fever] : no fever [Chills] : no chills [Night Sweats] : no night sweats [Recent Change In Weight] : ~T no recent weight change [FreeTextEntry4] : mouth sores resolved

## 2025-03-28 NOTE — PHYSICAL EXAM
[Restricted in physically strenuous activity but ambulatory and able to carry out work of a light or sedentary nature] : Status 1- Restricted in physically strenuous activity but ambulatory and able to carry out work of a light or sedentary nature, e.g., light house work, office work [Normal] : affect appropriate [de-identified] : right breast [place] areolar scar healed; no palpable axillary LN

## 2025-03-28 NOTE — ASSESSMENT
[Curative] : Goals of care discussed with patient: Curative [FreeTextEntry1] : She is a 40 y/o premenopausal BRCA 1 positive  F with Stage I right breast  invasive ductal carcinoma poorly differentiated ER negative, CT negative, Her2 negative s/p right lumpectomy.  She completed adjuvant ddAC and will receive Taxol C2 today. She has experienced Grade 1 fatigue along with mouth sores that have now resolved. We reviewed potential for cumulative fatigue and expectations with subsequent cycles of chemotherapy. Reviewed use of cold therapy during taxane infusion to potentially reduce peripheral neuropathy: no neuropathy thus far. Counts reviewed and stable to proceed with treatment today. Questions answered to her satisfaction. She is agreeable with plan. Next follow up in 2 weeks but earlier if any new symptoms.

## 2025-03-28 NOTE — HISTORY OF PRESENT ILLNESS
[Disease: _____________________] : Disease: [unfilled] [T: ___] : T[unfilled] [N: ___] : N[unfilled] [AJCC Stage: ____] : AJCC Stage: [unfilled] [de-identified] : Age 41: triple negative right breast cancer  Screen detected: she had breast imaging done on 6/19/2024 which showed 0.8 cm mass in the upper inner right breast 3 cm from the nipple. Call back imaging with sonogram showed benign complicated cyst at 1:00 3 cm FN measuring 1.2 cm at site of nodule seen on mammogram. She had follow up breast imaging done on 10/4/2024 which showed indeterminate 1:00 indeterminate nodule. She had biopsy of the right breast 1:00 3 cm FN nodule on 10/14/2024. The biopsy showed invasive ductal carcinoma poorly differentiated ER negative, [place] negative, Her2 negative. MRI of the breast done on 11/13/2024 showed newly diagnosed malignancy in the right breast 1.4 cm enhancing mass containing a biopsy marker in the upper inner quadrant and no significant axillary or internal mammary adenopathy. She underwent right lumpectomy and SLNB with Dr Torres on 12/6/2024. The pathology showed invasive ductal carcinoma measuring 19 mm, sentinel and non-sentinel lymph nodes were negative (0/12), DCIS was present and DCIS within 1 mm of inferior margin. We reviewed adjuvant therapy and she consented to ddACT. She started on ddAC and had expected fatigue and mouth sores.  [de-identified] :  invasive ductal carcinoma poorly differentiated ER negative, [place] negative, Her2 negative [de-identified] : Genetic testing: BRCA 1 positive: [id number] ddAC 1/24/2025 to 3/7/2025  weekly paclitaxel: 3/28/2025  [de-identified] : She completed four cycles of ddAC and is here for Taxol C2 today. She has noticed fatigue has slightly improved now that ddAC is complete. She had mouth sores that have resolved. Appetite has decreased but she has been encouraging herself to eat and hydrate. She denies any muscle or bone pain. She denies any nausea, vomiting, or diarrhea. She has regular BMs. She denies numbness or tingling over the hands and feet.

## 2025-03-28 NOTE — REASON FOR VISIT
[Follow-Up Visit] : a follow-up [Spouse] : spouse [FreeTextEntry2] : Follow up for breast cancer s/p ddAC, C2 Taxol today

## 2025-04-11 NOTE — REASON FOR VISIT
[Follow-Up Visit] : a follow-up [Spouse] : spouse [FreeTextEntry2] : Follow up for breast cancer s/p ddAC, C4 Taxol today

## 2025-04-11 NOTE — REVIEW OF SYSTEMS
[Fatigue] : fatigue [Diarrhea: Grade 0] : Diarrhea: Grade 0 [Negative] : Allergic/Immunologic [Dizziness] : dizziness [Fever] : no fever [Chills] : no chills [Night Sweats] : no night sweats [Recent Change In Weight] : ~T no recent weight change [Confused] : no confusion [Fainting] : no fainting [Difficulty Walking] : no difficulty walking [FreeTextEntry4] : mouth sores resolved [FreeTextEntry7] : occasional nausea

## 2025-04-11 NOTE — HISTORY OF PRESENT ILLNESS
[de-identified] : Patient here for C3 Taxol . Patient received Dex 8mg IV x 1, Pepcid 20mg IV x 1 , and Benadryl 25mg IV x 1 premedications. 5 min into the infusion patient developed chest tightness and dizziness. Denied h/a, n/v chills, cough , sob ,cp , palpitations , abdominal pain , back pain , numbness, tingling or pruritus. On exam: VS: T:98.7F, BP:123/85, HR:74, O2; 97% ORA . PE: No rashes, hives or angioedema appreciated, Lungs CTA b/l, no rales, wheezes or rhonchi, HR:RRR. Given Solumedrol 125mg IV x 1 with resolution of sx. Primary team made aware. Infusion rate decreased , given over 90min instead of 60 min.

## 2025-04-11 NOTE — ASSESSMENT
[Curative] : Goals of care discussed with patient: Curative [FreeTextEntry1] : She is a 42 y/o premenopausal BRCA 1 positive  F with Stage I right breast  invasive ductal carcinoma poorly differentiated ER negative, [place] negative, Her2 negative s/p right lumpectomy.  She completed adjuvant ddAC and is currently on weekly paclitaxel. She has experienced Grade 1 fatigue along with nausea and mouth sores that have now resolved. We reviewed potential for cumulative fatigue and expectations with subsequent cycles of chemotherapy. Reviewed use of cold therapy during taxane infusion to potentially reduce peripheral neuropathy: no neuropathy thus far. We reviewed she can continue with exercise and activity as tolerated. Counts reviewed and stable to proceed with C4 paclitaxel today. Questions answered to her satisfaction. She is agreeable with plan. Next follow up in 2 weeks but earlier if any new symptoms.

## 2025-04-11 NOTE — PHYSICAL EXAM
[Restricted in physically strenuous activity but ambulatory and able to carry out work of a light or sedentary nature] : Status 1- Restricted in physically strenuous activity but ambulatory and able to carry out work of a light or sedentary nature, e.g., light house work, office work [Normal] : affect appropriate [de-identified] : right breast [place] areolar scar healed; no palpable axillary LN

## 2025-04-11 NOTE — ASSESSMENT
[Curative] : Goals of care discussed with patient: Curative [FreeTextEntry1] : She is a 40 y/o premenopausal BRCA 1 positive  F with Stage I right breast  invasive ductal carcinoma poorly differentiated ER negative, PA negative, Her2 negative s/p right lumpectomy.  She completed adjuvant ddAC and is currently on weekly paclitaxel. She has experienced Grade 1 fatigue along with nausea and mouth sores that have now resolved. We reviewed potential for cumulative fatigue and expectations with subsequent cycles of chemotherapy. Reviewed use of cold therapy during taxane infusion to potentially reduce peripheral neuropathy: no neuropathy thus far. We reviewed she can continue with exercise and activity as tolerated. Counts reviewed and stable to proceed with C4 paclitaxel today. Questions answered to her satisfaction. She is agreeable with plan. Next follow up in 2 weeks but earlier if any new symptoms.

## 2025-04-11 NOTE — PHYSICAL EXAM
[Restricted in physically strenuous activity but ambulatory and able to carry out work of a light or sedentary nature] : Status 1- Restricted in physically strenuous activity but ambulatory and able to carry out work of a light or sedentary nature, e.g., light house work, office work [Normal] : affect appropriate [de-identified] : right breast [place] areolar scar healed; no palpable axillary LN

## 2025-04-11 NOTE — HISTORY OF PRESENT ILLNESS
[Disease: _____________________] : Disease: [unfilled] [T: ___] : T[unfilled] [N: ___] : N[unfilled] [AJCC Stage: ____] : AJCC Stage: [unfilled] [de-identified] : Age 41: triple negative right breast cancer  Screen detected: she had breast imaging done on 6/19/2024 which showed 0.8 cm mass in the upper inner right breast 3 cm from the nipple. Call back imaging with sonogram showed benign complicated cyst at 1:00 3 cm FN measuring 1.2 cm at site of nodule seen on mammogram. She had follow up breast imaging done on 10/4/2024 which showed indeterminate 1:00 indeterminate nodule. She had biopsy of the right breast 1:00 3 cm FN nodule on 10/14/2024. The biopsy showed invasive ductal carcinoma poorly differentiated ER negative, AR negative, Her2 negative. MRI of the breast done on 11/13/2024 showed newly diagnosed malignancy in the right breast 1.4 cm enhancing mass containing a biopsy marker in the upper inner quadrant and no significant axillary or internal mammary adenopathy. She underwent right lumpectomy and SLNB with Dr Torres on 12/6/2024. The pathology showed invasive ductal carcinoma measuring 19 mm, sentinel and non-sentinel lymph nodes were negative (0/12), DCIS was present and DCIS within 1 mm of inferior margin. We reviewed adjuvant therapy and she consented to ddACT. She started on ddAC and had expected fatigue and mouth sores.  [de-identified] :  invasive ductal carcinoma poorly differentiated ER negative, [place] negative, Her2 negative [de-identified] : Genetic testing: BRCA 1 positive: [id number] ddAC 1/24/2025 to 3/7/2025  weekly paclitaxel: 3/28/2025  [de-identified] : She has mild fatigue but otherwise feels well. She is wondering if it is ok to walk on the treadmill at home. She has occasional nausea but does not need to take prochlorperazine, no vomiting. Appetite has decreased but she has been encouraging herself to eat and hydrate. She denies any muscle or bone pain. She has regular BMs. She denies numbness or tingling over the hands and feet. She notices dizziness on occasion but denies loss of balance, chest pain, or SOB.

## 2025-04-11 NOTE — HISTORY OF PRESENT ILLNESS
[Disease: _____________________] : Disease: [unfilled] [T: ___] : T[unfilled] [N: ___] : N[unfilled] [AJCC Stage: ____] : AJCC Stage: [unfilled] [de-identified] : Age 41: triple negative right breast cancer  Screen detected: she had breast imaging done on 6/19/2024 which showed 0.8 cm mass in the upper inner right breast 3 cm from the nipple. Call back imaging with sonogram showed benign complicated cyst at 1:00 3 cm FN measuring 1.2 cm at site of nodule seen on mammogram. She had follow up breast imaging done on 10/4/2024 which showed indeterminate 1:00 indeterminate nodule. She had biopsy of the right breast 1:00 3 cm FN nodule on 10/14/2024. The biopsy showed invasive ductal carcinoma poorly differentiated ER negative, [place] negative, Her2 negative. MRI of the breast done on 11/13/2024 showed newly diagnosed malignancy in the right breast 1.4 cm enhancing mass containing a biopsy marker in the upper inner quadrant and no significant axillary or internal mammary adenopathy. She underwent right lumpectomy and SLNB with Dr Torres on 12/6/2024. The pathology showed invasive ductal carcinoma measuring 19 mm, sentinel and non-sentinel lymph nodes were negative (0/12), DCIS was present and DCIS within 1 mm of inferior margin. We reviewed adjuvant therapy and she consented to ddACT. She started on ddAC and had expected fatigue and mouth sores.  [de-identified] :  invasive ductal carcinoma poorly differentiated ER negative, [place] negative, Her2 negative [de-identified] : Genetic testing: BRCA 1 positive: [id number] ddAC 1/24/2025 to 3/7/2025  weekly paclitaxel: 3/28/2025  [de-identified] : She has mild fatigue but otherwise feels well. She is wondering if it is ok to walk on the treadmill at home. She has occasional nausea but does not need to take prochlorperazine, no vomiting. Appetite has decreased but she has been encouraging herself to eat and hydrate. She denies any muscle or bone pain. She has regular BMs. She denies numbness or tingling over the hands and feet. She notices dizziness on occasion but denies loss of balance, chest pain, or SOB.

## 2025-05-09 NOTE — REVIEW OF SYSTEMS
[Fever] : no fever [Chills] : no chills [Night Sweats] : no night sweats [Recent Change In Weight] : ~T no recent weight change [Confused] : no confusion [Fainting] : no fainting [Difficulty Walking] : no difficulty walking [FreeTextEntry4] : mouth sores resolved [FreeTextEntry7] : occasional nausea

## 2025-05-09 NOTE — HISTORY OF PRESENT ILLNESS
[de-identified] : Age 41: triple negative right breast cancer  Screen detected: she had breast imaging done on 6/19/2024 which showed 0.8 cm mass in the upper inner right breast 3 cm from the nipple. Call back imaging with sonogram showed benign complicated cyst at 1:00 3 cm FN measuring 1.2 cm at site of nodule seen on mammogram. She had follow up breast imaging done on 10/4/2024 which showed indeterminate 1:00 indeterminate nodule. She had biopsy of the right breast 1:00 3 cm FN nodule on 10/14/2024. The biopsy showed invasive ductal carcinoma poorly differentiated ER negative, [place] negative, Her2 negative. MRI of the breast done on 11/13/2024 showed newly diagnosed malignancy in the right breast 1.4 cm enhancing mass containing a biopsy marker in the upper inner quadrant and no significant axillary or internal mammary adenopathy. She underwent right lumpectomy and SLNB with Dr Torres on 12/6/2024. The pathology showed invasive ductal carcinoma measuring 19 mm, sentinel and non-sentinel lymph nodes were negative (0/12), DCIS was present and DCIS within 1 mm of inferior margin. We reviewed adjuvant therapy and she consented to ddACT. She started on ddAC and had expected fatigue and mouth sores.  [de-identified] :  invasive ductal carcinoma poorly differentiated ER negative, [place] negative, Her2 negative [de-identified] : Genetic testing: BRCA 1 positive: [id number] ddAC 1/24/2025 to 3/7/2025  weekly paclitaxel: 3/28/2025  [de-identified] : She has noticed occasional heaviness over the feet and itching over the soles but denies pain or issues with ambulation. No numbness and tingling over the hands and feet. She has noticed fatigue is slightly worse but otherwise is tolerating treatment well. She has been doing walking exercises on treadmill at home. She continues to have occasional nausea but does not need to take prochlorperazine, no vomiting. Appetite remains decreased but she has been encouraging herself to eat and hydrate. She notices dizziness on occasion but denies loss of balance, chest pain, or SOB.

## 2025-05-09 NOTE — ASSESSMENT
[FreeTextEntry1] : She is a 40 y/o premenopausal BRCA 1 positive  F with Stage I right breast  invasive ductal carcinoma poorly differentiated ER negative, [place] negative, Her2 negative s/p right lumpectomy.  She completed adjuvant ddAC and is currently on weekly paclitaxel. She has experienced Grade 1 fatigue along with nausea. We reviewed potential for cumulative fatigue and expectations with subsequent cycles of chemotherapy. Reviewed use of cold therapy during taxane infusion to potentially reduce peripheral neuropathy: currently with Grade 1 neuropathy over the feet that is intermittent. Counts reviewed and stable to proceed with C8 paclitaxel today. Questions answered to her satisfaction. She is agreeable with plan. Next follow up in 2 weeks but earlier if any new symptoms.

## 2025-05-23 NOTE — REASON FOR VISIT
[Follow-Up Visit] : a follow-up [Family Member] : family member [FreeTextEntry2] : follow up for breast cancer on weekly paclitaxel

## 2025-05-23 NOTE — PHYSICAL EXAM
[Restricted in physically strenuous activity but ambulatory and able to carry out work of a light or sedentary nature] : Status 1- Restricted in physically strenuous activity but ambulatory and able to carry out work of a light or sedentary nature, e.g., light house work, office work [Normal] : affect appropriate [de-identified] : el[place] areolar scar healing; mild edema but no erythema. No palpable axillary LN

## 2025-05-23 NOTE — REVIEW OF SYSTEMS
[Diarrhea: Grade 0] : Diarrhea: Grade 0 [Confused] : no confusion [Dizziness] : no dizziness [Fainting] : no fainting [Difficulty Walking] : no difficulty walking [Negative] : Allergic/Immunologic [de-identified] : numbness over fingertips

## 2025-05-23 NOTE — CONSULT LETTER
[Dear  ___] : Dear  [unfilled], [Courtesy Letter:] : I had the pleasure of seeing your patient, [unfilled], in my office today. [Please see my note below.] : Please see my note below. [Consult Closing:] : Thank you very much for allowing me to participate in the care of this patient.  If you have any questions, please do not hesitate to contact me. [Sincerely,] : Sincerely, [FreeTextEntry2] : Ness Torres  [facility] [address]  [place] [address] [FreeTextEntry3] : Saud Lawton MD Attending [facility]

## 2025-05-23 NOTE — ASSESSMENT
[FreeTextEntry1] : She is a 40 y/o premenopausal BRCA 1 positive  F with Stage I right breast invasive ductal carcinoma poorly differentiated ER negative, [place] negative, Her2 negative s/p right lumpectomy.  She is s/p completion of ddAC and on weekly paclitaxel: week 10 today. She will complete 12 weeks of therapy on 6/6/2025. We reviewed expectation for surgery usually 4 to 6 weeks from completion of chemotherapy. She is planning on B mastectomy due to BRCA 1 mutation. She is tolerating therapy with expected Grade 1 fatigue and neuropathy. We reviewed expectations for cumulative neuropathy and role of massage or cold therapy to lessen this side effect. Reviewed neuropathy could be chronic symptom after chemotherapy completion. We reviewed continued exercise and low fat diet. Reviewed surveillance with history and exam every 3 months for the first 2 years and then every 6 months for 3 years. Questions answered to her satisfaction. She is agreeable with plan. Next follow up in 2 weeks but earlier if any new symptoms.   BRCA 1 mutation: will be considering B mastectomy after recovery from chemotherapy.

## 2025-05-23 NOTE — HISTORY OF PRESENT ILLNESS
[Disease: _____________________] : Disease: [unfilled] [T: ___] : T[unfilled] [N: ___] : N[unfilled] [AJCC Stage: ____] : AJCC Stage: [unfilled] [de-identified] : Age 41: triple negative right breast cancer  Screen detected: she had breast imaging done on 6/19/2024 which showed 0.8 cm mass in the upper inner right breast 3 cm from the nipple. Call back imaging with sonogram showed benign complicated cyst at 1:00 3 cm FN measuring 1.2 cm at site of nodule seen on mammogram. She had follow up breast imaging done on 10/4/2024 which showed indeterminate 1:00 indeterminate nodule. She had biopsy of the right breast 1:00 3 cm FN nodule on 10/14/2024. The biopsy showed invasive ductal carcinoma poorly differentiated ER negative, [place] negative, Her2 negative. MRI of the breast done on 11/13/2024 showed newly diagnosed malignancy in the right breast 1.4 cm enhancing mass containing a biopsy marker in the upper inner quadrant and no significant axillary or internal mammary adenopathy. She underwent right lumpectomy and SLNB with Dr Torres on 12/6/2024. The pathology showed invasive ductal carcinoma measuring 19 mm, sentinel and non-sentinel lymph nodes were negative (0/12), DCIS was present and DCIS within 1 mm of inferior margin. We reviewed adjuvant therapy and she consented to ddACT. She started on ddAC and had expected fatigue and mouth sores. She started on paclitaxel on 3/28/2025.  [de-identified] :  invasive ductal carcinoma poorly differentiated ER negative, PA negative, Her2 negative [de-identified] : Genetic testing: BRCA 1 positive: [id number] ddAC 1/24/2025 to 3/7/2025  weekly paclitaxel: 3/28/2025  [de-identified] : Since last evaluation, she is on weekly paclitaxel and will finish week 12 on 6/6/2025. She is planning to have mastectomy after recovery from chemotherapy. She has fatigue with treatment and notices numbness over the fingertips. Denies any pain or change in sensation or holding items. She denies any N/V/D. No myalgias or arthralgias. Wondering if there is any role for RT if she has mastectomy.

## 2025-07-08 NOTE — PAST MEDICAL HISTORY
[Menarche Age ____] : age at menarche was [unfilled] [Approximately ___] : the LMP was approximately [unfilled] [Total Preg ___] : G[unfilled] [Live Births ___] : P[unfilled]  [Full Term ___] : Full Term: [unfilled] [Living ___] : Living: [unfilled] [AB Spont ___] : miscarriages: [unfilled]  [de-identified] : pt stopped with periods in January 2025 due to chemotherapy

## 2025-07-08 NOTE — HISTORY OF PRESENT ILLNESS
[FreeTextEntry1] : Ref: Ness Torres MD GYN:  Cannot recall name of GYN at this initial visit. PCP:  Dr. Elly Irving on [address] in [facility] Med Onc:   Saud Lawton  Presents with her , Paul.  Pt speaks English but Citizen of [place] is first language  Ms. Flores, 41 years old, referred for consult due to positive BRCA 1 genetic mutation.  Patient has a history of triple negative right breast cancer.  She is status post right lumpectomy and sentinel lymph node biopsy with Dr. Torres on 2024.  She completed 12 weeks of weekly paclitaxel.  Last dose 2025.  She is planning on mastectomy after recovering from chemotherapy.    Denies f/c/n/v/d/vaginal bleeding, changes to bowel or bladder habits.  Denies unintentional weight loss or gain. Denies abdominal pain, bloating or any other associated symptoms.  Pt had her last period in ; She has completed childbearing.   She reports she was prescribed metformin in  for an elevated HbA1c but has not started taking medication.    Labs: 24:   = 10   Genetics:  BRCA 1 - [id number] - pathogenic mutation ([employer])  Imagin2025 CT C/A/P ([facility]) CHEST: LUNGS AND LARGE AIRWAYS: Patent central airways. 2 mm right upper lobe nodule (4, 88). Right lower lobe calcified granuloma. PLEURA: No pleural effusion. VESSELS: Within normal limits. HEART: Heart size is normal. No pericardial effusion. MEDIASTINUM AND KIANA: No lymphadenopathy. CHEST WALL AND LOWER NECK: Right breast skin thickening and soft tissue edema. Surgical clips in the right axilla. ABDOMEN AND PELVIS: LIVER: Within normal limits. BILE DUCTS: Normal caliber. GALLBLADDER: Within normal limits. SPLEEN: Within normal limits. PANCREAS: Within normal limits. ADRENALS: Within normal limits. KIDNEYS/URETERS: Within normal limits. BLADDER: Within normal limits. REPRODUCTIVE ORGANS: 1.3 cm right paraovarian cyst. Vaginal tampon. BOWEL: No bowel obstruction. Appendix is normal. PERITONEUM/RETROPERITONEUM: Within normal limits. VESSELS: Within normal limits. LYMPH NODES: No lymphadenopathy. ABDOMINAL WALL: Within normal limits. BONES: Within normal limits. IMPRESSION: Nonspecific 2 mm right upper lobe pulmonary nodule. No evidence of metastatic disease in the abdomen or pelvis.  POB:  (2 vaginal deliveries, children are ages 24 and 21;  1 miscarriage) PGYN: Menarche age 13, LMP 2025 PMH: elevated glucose (prescribed metformin in 2025 but not taking it);  triple negative breast cancer Meds: metformin (prescribed) Allergies: NKDA PSH: No past surgical history Social Hx: Lives with  and children, non smoker, social alcohol, no drugs FH: paternal aunt - breast cancer - age 60's; paternal grandmother - bone cancer - age 60; maternal grandmother - uterine cancer - age 70  Health Maintenance: Mammogram: - followed with breast surgery Colonoscopy: never - pt aware colon cancer screening baseline colonoscopy begins at age 45 DEXA: PAP: unsure but believes it was a year ago.

## 2025-07-08 NOTE — PAST MEDICAL HISTORY
[Menarche Age ____] : age at menarche was [unfilled] [Approximately ___] : the LMP was approximately [unfilled] [Total Preg ___] : G[unfilled] [Live Births ___] : P[unfilled]  [Full Term ___] : Full Term: [unfilled] [Living ___] : Living: [unfilled] [AB Spont ___] : miscarriages: [unfilled]  [de-identified] : pt stopped with periods in January 2025 due to chemotherapy

## 2025-07-08 NOTE — DISCUSSION/SUMMARY
[Reviewed Clinical Lab Test(s)] : Results of clinical tests were reviewed. [Reviewed Radiology Report(s)] : Radiology reports were reviewed. [Reviewed Radiology Film/Image(s)] : Images from radiology studies were reviewed and examined. [Discuss Tests w/Referring Providers] : Results of labs/radiology studies and the treatment recommendations were discussed with performing/referring physician. [Discuss Alternatives/Risks/Benefits w/Patient] : All alternatives, risks, and benefits were discussed with the patient/family and all questions were answered.  Patient expressed good understanding and appreciates the importance of follow up as recommended. [Visit Time ___ Minutes] : [unfilled] minutes [Face to Face Time___ Minutes] : with [unfilled] minutes in face to face consultation. [FreeTextEntry1] : - Patients history and work up to date reviewed in detail. -We reviewed the genetics of inherited breast and ovarian cancer syndrome. HBOC is associated with mutations in the BRCA 1 and BRCA 2 genes. BRCA mutations are associated with significantly increased risk for development of both breast and ovarian, fallopian tube and primary peritoneal cancers. Lifetime risk of developing breast cancer is estimated to be 55-85%, while ovarian cancer risk is estimated to be 15-50%. Patients with BRCA mutation may also have a small increase in the risk for development of pancreatic cancer. BRCA mutations are inherited in an autosomal dominant fashion and thus first-degree relatives have a 50% chance of inheriting a known family mutation.  -For women without a personal history of cancer in whom a BRCA1/2 pathogenic variant is identified, national guidelines recommend risk-reducing bilateral salpingo-oophorectomy (rrBSO) once childbearing is complete, and between the ages of 35 and 40. Studies show that rrBSO significantly reduces the risk of ovarian cancer. However, discussed that primary peritoneal carcinoma should be considered a phenotypic variant of ovarian cancer and that women who undergo rrBSO remain at risk for developing this cancer, which is much less common than ovarian cancer but associated with high mortality, similar to that of stage III epithelial ovarian cancer. Discussed that including patients with BRCA1/2 mutations concluded the risk of peritoneal cancer after rrBSO was 1.7 percent (range, 0.5 to 10.7 percent).  -Nevertheless, we discussed that screening options for the detection of ovarian cancer are limited. Neither ultrasound nor  has been shown to be accurate detectors of early stage disease. Bilateral salpingoopherectomy is strongly recommended for all mutation carriers at completion of childbearing or by age 40. BSO has been associated with reduced all cause, breast cancer specific and ovarian cancer associated mortality. While the risk reduction after prophylactic BSO is substantial, a small risk for development of primary peritoneal cancer remains. 2-4% of patients undergoing risk reducing surgery will be found to have an incidental ovarian or fallopian tube cancer. Surgical management of ovarian cancer was reviewed in detail including the importance of comprehensive staging.  -Recent studies have reported a possible small, excess risk of uterine cancers in BRCA 1 mutation carriers, however, the absolute risk is low. There are no national guidelines that recommend routine hysterectomy in BRCA mutation carriers. While hysterectomy is not definitively indicated to reduce cancer risk, some women may choose to undergo hysterectomy at the time of rrBSO in order to enable them to take unopposed estrogen HRT without increasing their risk of endometrial cancer. The patient expressed that she would like hysterectomy at the time of her rrBSO and risks/benefits were reviewed in detail.  -We discussed that her current surgeon for her breast cancer may not be accepting her insurance and she would like a referral to a new surgeon. Referral provided and my navigation team will help work on this. In the interim, she would like to proceed with risk reductive surgery from a gyn standpoint. After extensive counseling she would like to proceed with total hysterectomy and BSO via an MIS approach.  - We discussed risks and benefits of surgery as well as the typical post operative course.  We also discussed potential alternatives to surgery. Ample time was allowed for questions to be asked and solicited.  All were answered and the patient expressed understanding.   We discussed the risks of surgery which include, but are not limited to: -Bleeding that may require a blood transfusion -Infection of the surgical incision sites, lungs, urine, kidneys or IV site that may can compromise healing and require IV antibiotics -Injury to surrounding structures including the bladder, bowel, ureters, urethra, blood vessels, or nerves that may result in a loss of function or sensation. -Blood clots in the extremities or lungs, which may lead to long term anticoagulation and difficulty breathing -The need for more surgery   After our discussion, all questions were again answered. She is aware of the risks and benefits and would like to proceed. Patient will be seen by our anesthesia colleagues in pre-admission testing and have preoperative labs drawn.    - We briefly discussed options for screening and risk reduction. Referral to GI and dermatology to be provided postop.   - Booking form placed, baseline preop labs ordered. TVUS ordered. - For close interval follow up via vteb to review baseline workup and discuss and preop questions that may arise.  - I spent the time noted on the day of this patient encounter preparing for, providing and documenting the above service. I have counseled and educated the patient on the differential, workup, disease course, and treatment/management plan. Education was provided to the patient during this encounter. All questions and concerns were answered and addressed in detail.

## 2025-07-08 NOTE — PHYSICAL EXAM
[MA] : MA [Normal] : Recto-Vaginal Exam: Normal [Fully active, able to carry on all pre-disease performance without restriction] : Status 0 - Fully active, able to carry on all pre-disease performance without restriction [FreeTextEntry2] : Rosalva  [de-identified] : soft, nontender

## 2025-07-08 NOTE — PHYSICAL EXAM
[MA] : MA [Normal] : Recto-Vaginal Exam: Normal [Fully active, able to carry on all pre-disease performance without restriction] : Status 0 - Fully active, able to carry on all pre-disease performance without restriction [FreeTextEntry2] : Rosalva  [de-identified] : soft, nontender

## 2025-07-08 NOTE — OB HISTORY
[Total Preg ___] : : [unfilled] [Full Term ___] : [unfilled] (full-term) [Living ___] : [unfilled] (living) [Vaginal ___] : [unfilled] vaginal delivery(s) [AB Spont ___] : [unfilled] miscarriage(s) [Approximately ___ (Month)] : the LMP was approximately [unfilled] month(s) ago [Menarche Age ____] : age at menarche was [unfilled]

## 2025-07-08 NOTE — ASSESSMENT
[FreeTextEntry1] : 42 yo with BRCA1 pathogenic mutation found in setting of recently diagnosed triple negative breast cancer s/p chemo here for initial consultation for risk reducing surgery

## 2025-07-08 NOTE — HISTORY OF PRESENT ILLNESS
[FreeTextEntry1] : Ref: Ness Torres MD GYN:  Cannot recall name of GYN at this initial visit. PCP:  Dr. Elly Irving on [address] in [facility] Med Onc:   Saud Lawton  Presents with her , Paul.  Pt speaks English but [place] is first language  Ms. Flores, 41 years old, referred for consult due to positive BRCA 1 genetic mutation.  Patient has a history of triple negative right breast cancer.  She is status post right lumpectomy and sentinel lymph node biopsy with Dr. Torres on 2024.  She completed 12 weeks of weekly paclitaxel.  Last dose 2025.  She is planning on mastectomy after recovering from chemotherapy.    Denies f/c/n/v/d/vaginal bleeding, changes to bowel or bladder habits.  Denies unintentional weight loss or gain. Denies abdominal pain, bloating or any other associated symptoms.  Pt had her last period in ; She has completed childbearing.   She reports she was prescribed metformin in  for an elevated HbA1c but has not started taking medication.    Labs: 24:   = 10   Genetics:  BRCA 1 - [id number] - pathogenic mutation ([employer])  Imagin2025 CT C/A/P ([facility]) CHEST: LUNGS AND LARGE AIRWAYS: Patent central airways. 2 mm right upper lobe nodule (4, 88). Right lower lobe calcified granuloma. PLEURA: No pleural effusion. VESSELS: Within normal limits. HEART: Heart size is normal. No pericardial effusion. MEDIASTINUM AND KIANA: No lymphadenopathy. CHEST WALL AND LOWER NECK: Right breast skin thickening and soft tissue edema. Surgical clips in the right axilla. ABDOMEN AND PELVIS: LIVER: Within normal limits. BILE DUCTS: Normal caliber. GALLBLADDER: Within normal limits. SPLEEN: Within normal limits. PANCREAS: Within normal limits. ADRENALS: Within normal limits. KIDNEYS/URETERS: Within normal limits. BLADDER: Within normal limits. REPRODUCTIVE ORGANS: 1.3 cm right paraovarian cyst. Vaginal tampon. BOWEL: No bowel obstruction. Appendix is normal. PERITONEUM/RETROPERITONEUM: Within normal limits. VESSELS: Within normal limits. LYMPH NODES: No lymphadenopathy. ABDOMINAL WALL: Within normal limits. BONES: Within normal limits. IMPRESSION: Nonspecific 2 mm right upper lobe pulmonary nodule. No evidence of metastatic disease in the abdomen or pelvis.  POB:  (2 vaginal deliveries, children are ages 24 and 21;  1 miscarriage) PGYN: Menarche age 13, LMP 2025 PMH: elevated glucose (prescribed metformin in 2025 but not taking it);  triple negative breast cancer Meds: metformin (prescribed) Allergies: NKDA PSH: No past surgical history Social Hx: Lives with  and children, non smoker, social alcohol, no drugs FH: paternal aunt - breast cancer - age 60's; paternal grandmother - bone cancer - age 60; maternal grandmother - uterine cancer - age 70  Health Maintenance: Mammogram: - followed with breast surgery Colonoscopy: never - pt aware colon cancer screening baseline colonoscopy begins at age 45 DEXA: PAP: unsure but believes it was a year ago.

## 2025-07-10 NOTE — DISCUSSION/SUMMARY
[Reviewed Clinical Lab Test(s)] : Results of clinical tests were reviewed. [Reviewed Radiology Report(s)] : Radiology reports were reviewed. [Reviewed Radiology Film/Image(s)] : Images from radiology studies were reviewed and examined. [Discuss Tests w/Referring Providers] : Results of labs/radiology studies and the treatment recommendations were discussed with performing/referring physician. [Discuss Alternatives/Risks/Benefits w/Patient] : All alternatives, risks, and benefits were discussed with the patient/family and all questions were answered.  Patient expressed good understanding and appreciates the importance of follow up as recommended. [Visit Time ___ Minutes] : [unfilled] minutes [Face to Face Time___ Minutes] : with [unfilled] minutes in face to face consultation. [FreeTextEntry1] : - Patients history and work up to date reviewed in detail. -Today, she presents to review her workup to date and adddress any perioperative questions she may have. We reviewed her recent labs notable for a normal CA-125 and HPV + pap smear (cytology still pending). Her recent TVUS is still pending to be reported. Recenet recent CT A/P in 1/2025 which was without any pathology in the gyn organs. Reviewed the small risk of there being occult cervical disease on final hysterectomy with her recent pap smear but she desires to proceed with surgery. HgbA1c of 6.9 and encouraged her to follow up with her PCP regarding this matter. We extensively reviewed the rationale for risk reductive surgery, lack of effective screening for ovarian cancer and the surgical approach. We reviewed in detail periopeartive expectations.   - We discussed risks and benefits of surgery as well as the typical post operative course.  We also discussed potential alternatives to surgery. Ample time was allowed for questions to be asked and solicited.  All were answered and the patient expressed understanding.   We discussed the risks of surgery which include, but are not limited to: -Bleeding that may require a blood transfusion -Infection of the surgical incision sites, lungs, urine, kidneys or IV site that may can compromise healing and require IV antibiotics -Injury to surrounding structures including the bladder, bowel, ureters, urethra, blood vessels, or nerves that may result in a loss of function or sensation. -Blood clots in the extremities or lungs, which may lead to long term anticoagulation and difficulty breathing -The need for more surgery   After our discussion, all questions were again answered. She is aware of the risks and benefits and would like to proceed. Patient will be seen by our anesthesia colleagues in pre-admission testing and have preoperative labs drawn. -Pending TVUS, will follow up once results return.  - I spent the time noted on the day of this patient encounter preparing for, providing and documenting the above service. I have counseled and educated the patient on the differential, workup, disease course, and treatment/management plan. Education was provided to the patient during this encounter. All questions and concerns were answered and addressed in detail.

## 2025-07-10 NOTE — PAST MEDICAL HISTORY
[Menarche Age ____] : age at menarche was [unfilled] [Approximately ___] : the LMP was approximately [unfilled] [Total Preg ___] : G[unfilled] [Live Births ___] : P[unfilled]  [Full Term ___] : Full Term: [unfilled] [Living ___] : Living: [unfilled] [AB Spont ___] : miscarriages: [unfilled]  [de-identified] : pt stopped with periods in January 2025 due to chemotherapy

## 2025-07-10 NOTE — HISTORY OF PRESENT ILLNESS
[FreeTextEntry1] : **Please note that this visit was converted to telemed due to technical difficulties** Permission was granted for this visit. Ref: Ness Torres MD GYN:  Cannot recall name of GYN at this initial visit. PCP:  Dr. Elly Irving on [address] in [facility] Onc:  Dr. Saud Lawton  Presents with her , Paul.  Pt speaks English but [place] is first language  Ms. Flores, 41 years old, referred for consult due to positive BRCA 1 genetic mutation.  Patient has a history of triple negative right breast cancer.  She is status post right lumpectomy and sentinel lymph node biopsy with Dr. Torres on 2024.  She completed 12 weeks of weekly paclitaxel.  Last dose 2025.  She is planning on mastectomy after recovering from chemotherapy.    Denies f/c/n/v/d/vaginal bleeding, changes to bowel or bladder habits.  Denies unintentional weight loss or gain. Denies abdominal pain, bloating or any other associated symptoms.  Pt had her last period in ; She has completed childbearing.   She reports she was prescribed metformin in  for an elevated HbA1c but has not started taking medication.    Today, she presents to review her workup to date and adddress any perioperative questions she may have. We reviewed her recent labs notable for a normal CA-125 and HPV + pap smear (cytology still pending). Her recent TVUS is still pending to be reported. Recenet recent CT A/P in 2025 which was without any pathology in the gyn organs. Reviewed the small risk of there being occult cervical disease on final hysterectomy with her recent pap smear but she desires to proceed with surgery. HgbA1c of 6.9 and encouraged her to follow up with her PCP regarding this matter. We extensively reviewed the rationale for risk reductive surgery, lack of effective screening for ovarian cancer and the surgical approach. We reviewed in detail periopeartive expectations.   Labs: 24:   = 10   Genetics:  BRCA 1 - [id number] - pathogenic mutation ([employer])  Imagin2025 CT C/A/P ([facility]) CHEST: LUNGS AND LARGE AIRWAYS: Patent central airways. 2 mm right upper lobe nodule (4, 88). Right lower lobe calcified granuloma. PLEURA: No pleural effusion. VESSELS: Within normal limits. HEART: Heart size is normal. No pericardial effusion. MEDIASTINUM AND KIANA: No lymphadenopathy. CHEST WALL AND LOWER NECK: Right breast skin thickening and soft tissue edema. Surgical clips in the right axilla. ABDOMEN AND PELVIS: LIVER: Within normal limits. BILE DUCTS: Normal caliber. GALLBLADDER: Within normal limits. SPLEEN: Within normal limits. PANCREAS: Within normal limits. ADRENALS: Within normal limits. KIDNEYS/URETERS: Within normal limits. BLADDER: Within normal limits. REPRODUCTIVE ORGANS: 1.3 cm right paraovarian cyst. Vaginal tampon. BOWEL: No bowel obstruction. Appendix is normal. PERITONEUM/RETROPERITONEUM: Within normal limits. VESSELS: Within normal limits. LYMPH NODES: No lymphadenopathy. ABDOMINAL WALL: Within normal limits. BONES: Within normal limits. IMPRESSION: Nonspecific 2 mm right upper lobe pulmonary nodule. No evidence of metastatic disease in the abdomen or pelvis.  POB:  (2 vaginal deliveries, children are ages 24 and 21;  1 miscarriage) PGYN: Menarche age 13, LMP 2025 PMH: elevated glucose (prescribed metformin in 2025 but not taking it);  triple negative breast cancer Meds: metformin (prescribed) Allergies: NKDA PSH: No past surgical history Social Hx: Lives with  and children, non smoker, social alcohol, no drugs FH: paternal aunt - breast cancer - age 60's; paternal grandmother - bone cancer - age 60; maternal grandmother - uterine cancer - age 70  Health Maintenance: Mammogram: - followed with breast surgery Colonoscopy: never - pt aware colon cancer screening baseline colonoscopy begins at age 45 DEXA: PAP: unsure but believes it was a year ago.

## 2025-07-10 NOTE — PAST MEDICAL HISTORY
[Menarche Age ____] : age at menarche was [unfilled] [Approximately ___] : the LMP was approximately [unfilled] [Total Preg ___] : G[unfilled] [Live Births ___] : P[unfilled]  [Full Term ___] : Full Term: [unfilled] [Living ___] : Living: [unfilled] [AB Spont ___] : miscarriages: [unfilled]  [de-identified] : pt stopped with periods in January 2025 due to chemotherapy

## 2025-07-10 NOTE — HISTORY OF PRESENT ILLNESS
[FreeTextEntry1] : **Please note that this visit was converted to telemed due to technical difficulties** Permission was granted for this visit. Ref: Ness Torres MD GYN:  Cannot recall name of GYN at this initial visit. PCP:  Dr. Elly Irving on [address] in [facility] Onc:  Dr. Saud Lawton  Presents with her , Paul.  Pt speaks English but [place] is first language  Ms. Flores, 41 years old, referred for consult due to positive BRCA 1 genetic mutation.  Patient has a history of triple negative right breast cancer.  She is status post right lumpectomy and sentinel lymph node biopsy with Dr. Torres on 2024.  She completed 12 weeks of weekly paclitaxel.  Last dose 2025.  She is planning on mastectomy after recovering from chemotherapy.    Denies f/c/n/v/d/vaginal bleeding, changes to bowel or bladder habits.  Denies unintentional weight loss or gain. Denies abdominal pain, bloating or any other associated symptoms.  Pt had her last period in ; She has completed childbearing.   She reports she was prescribed metformin in  for an elevated HbA1c but has not started taking medication.    Today, she presents to review her workup to date and adddress any perioperative questions she may have. We reviewed her recent labs notable for a normal CA-125 and HPV + pap smear (cytology still pending). Her recent TVUS is still pending to be reported. Recenet recent CT A/P in 2025 which was without any pathology in the gyn organs. Reviewed the small risk of there being occult cervical disease on final hysterectomy with her recent pap smear but she desires to proceed with surgery. HgbA1c of 6.9 and encouraged her to follow up with her PCP regarding this matter. We extensively reviewed the rationale for risk reductive surgery, lack of effective screening for ovarian cancer and the surgical approach. We reviewed in detail periopeartive expectations.   Labs: 24:   = 10   Genetics:  BRCA 1 - [id number] - pathogenic mutation ([employer])  Imagin2025 CT C/A/P ([facility]) CHEST: LUNGS AND LARGE AIRWAYS: Patent central airways. 2 mm right upper lobe nodule (4, 88). Right lower lobe calcified granuloma. PLEURA: No pleural effusion. VESSELS: Within normal limits. HEART: Heart size is normal. No pericardial effusion. MEDIASTINUM AND KIANA: No lymphadenopathy. CHEST WALL AND LOWER NECK: Right breast skin thickening and soft tissue edema. Surgical clips in the right axilla. ABDOMEN AND PELVIS: LIVER: Within normal limits. BILE DUCTS: Normal caliber. GALLBLADDER: Within normal limits. SPLEEN: Within normal limits. PANCREAS: Within normal limits. ADRENALS: Within normal limits. KIDNEYS/URETERS: Within normal limits. BLADDER: Within normal limits. REPRODUCTIVE ORGANS: 1.3 cm right paraovarian cyst. Vaginal tampon. BOWEL: No bowel obstruction. Appendix is normal. PERITONEUM/RETROPERITONEUM: Within normal limits. VESSELS: Within normal limits. LYMPH NODES: No lymphadenopathy. ABDOMINAL WALL: Within normal limits. BONES: Within normal limits. IMPRESSION: Nonspecific 2 mm right upper lobe pulmonary nodule. No evidence of metastatic disease in the abdomen or pelvis.  POB:  (2 vaginal deliveries, children are ages 24 and 21;  1 miscarriage) PGYN: Menarche age 13, LMP 2025 PMH: elevated glucose (prescribed metformin in 2025 but not taking it);  triple negative breast cancer Meds: metformin (prescribed) Allergies: NKDA PSH: No past surgical history Social Hx: Lives with  and children, non smoker, social alcohol, no drugs FH: paternal aunt - breast cancer - age 60's; paternal grandmother - bone cancer - age 60; maternal grandmother - uterine cancer - age 70  Health Maintenance: Mammogram: - followed with breast surgery Colonoscopy: never - pt aware colon cancer screening baseline colonoscopy begins at age 45 DEXA: PAP: unsure but believes it was a year ago.